# Patient Record
Sex: MALE | Race: WHITE | Employment: FULL TIME | URBAN - METROPOLITAN AREA
[De-identification: names, ages, dates, MRNs, and addresses within clinical notes are randomized per-mention and may not be internally consistent; named-entity substitution may affect disease eponyms.]

---

## 2019-09-04 ENCOUNTER — TELEPHONE (OUTPATIENT)
Dept: FAMILY MEDICINE | Facility: CLINIC | Age: 54
End: 2019-09-04

## 2019-09-04 NOTE — TELEPHONE ENCOUNTER
Patient's wife called requesting colonoscopy referral to [facility] in [place]. Please call back to let them know if they are able to get referral.    Liset Oneil-Patient Rep

## 2019-09-30 ENCOUNTER — HEALTH MAINTENANCE LETTER (OUTPATIENT)
Age: 54
End: 2019-09-30

## 2019-09-30 ENCOUNTER — OFFICE VISIT (OUTPATIENT)
Dept: FAMILY MEDICINE | Facility: CLINIC | Age: 54
End: 2019-09-30
Payer: COMMERCIAL

## 2019-09-30 VITALS
HEART RATE: 68 BPM | OXYGEN SATURATION: 97 % | RESPIRATION RATE: 17 BRPM | BODY MASS INDEX: 28.92 KG/M2 | WEIGHT: 202 LBS | DIASTOLIC BLOOD PRESSURE: 68 MMHG | SYSTOLIC BLOOD PRESSURE: 126 MMHG | HEIGHT: 70 IN | TEMPERATURE: 98.2 F

## 2019-09-30 DIAGNOSIS — Z00.00 ROUTINE GENERAL MEDICAL EXAMINATION AT A HEALTH CARE FACILITY: Primary | ICD-10-CM

## 2019-09-30 DIAGNOSIS — Z12.11 COLON CANCER SCREENING: ICD-10-CM

## 2019-09-30 DIAGNOSIS — Z11.4 ENCOUNTER FOR SCREENING FOR HIV: ICD-10-CM

## 2019-09-30 PROCEDURE — 90471 IMMUNIZATION ADMIN: CPT | Performed by: FAMILY MEDICINE

## 2019-09-30 PROCEDURE — 90715 TDAP VACCINE 7 YRS/> IM: CPT | Performed by: FAMILY MEDICINE

## 2019-09-30 PROCEDURE — 87389 HIV-1 AG W/HIV-1&-2 AB AG IA: CPT | Performed by: FAMILY MEDICINE

## 2019-09-30 PROCEDURE — 90472 IMMUNIZATION ADMIN EACH ADD: CPT | Performed by: FAMILY MEDICINE

## 2019-09-30 PROCEDURE — 80053 COMPREHEN METABOLIC PANEL: CPT | Performed by: FAMILY MEDICINE

## 2019-09-30 PROCEDURE — 99386 PREV VISIT NEW AGE 40-64: CPT | Mod: 25 | Performed by: FAMILY MEDICINE

## 2019-09-30 PROCEDURE — 36415 COLL VENOUS BLD VENIPUNCTURE: CPT | Performed by: FAMILY MEDICINE

## 2019-09-30 PROCEDURE — 90682 RIV4 VACC RECOMBINANT DNA IM: CPT | Performed by: FAMILY MEDICINE

## 2019-09-30 PROCEDURE — 80061 LIPID PANEL: CPT | Performed by: FAMILY MEDICINE

## 2019-09-30 ASSESSMENT — ENCOUNTER SYMPTOMS
CHILLS: 0
WEAKNESS: 0
PALPITATIONS: 0
DYSURIA: 0
JOINT SWELLING: 0
HEADACHES: 0
SHORTNESS OF BREATH: 0
PARESTHESIAS: 0
EYE PAIN: 0
ARTHRALGIAS: 0
FEVER: 0
SORE THROAT: 0
NAUSEA: 0
MYALGIAS: 0
NERVOUS/ANXIOUS: 0
COUGH: 0
HEARTBURN: 0
CONSTIPATION: 0
FREQUENCY: 0
HEMATOCHEZIA: 0
DIARRHEA: 0
HEMATURIA: 0
ABDOMINAL PAIN: 0
DIZZINESS: 0

## 2019-09-30 ASSESSMENT — MIFFLIN-ST. JEOR: SCORE: 1767.52

## 2019-09-30 NOTE — PROGRESS NOTES
"SUBJECTIVE:   CC: Edgar Celestin is an 54 year old male who presents for preventative health visit.     Healthy Habits:     Getting at least 3 servings of Calcium per day:  Yes    Bi-annual eye exam:  Yes    Dental care twice a year:  Yes    Sleep apnea or symptoms of sleep apnea:  None    Diet:  Regular (no restrictions)    Frequency of exercise:  2-3 days/week    Duration of exercise:  15-30 minutes    Taking medications regularly:  Yes    PHQ-2 Total Score: 0    Additional concerns today:  No    Patient reports an intermittent right-sided abdominal \"quiver.\" This started one year ago after lifting a heavy log. The symptoms occur while sitting in specific positions. Denies abdominal pain.     Today's PHQ-2 Score:   PHQ-2 ( 1999 [employer]) 9/30/2019   Q1: Little interest or pleasure in doing things 0   Q2: Feeling down, depressed or hopeless 0   PHQ-2 Score 0   Q1: Little interest or pleasure in doing things Not at all   Q2: Feeling down, depressed or hopeless Not at all   PHQ-2 Score 0     Abuse: Current or Past(Physical, Sexual or Emotional)- No  Do you feel safe in your environment? Yes    Social History     Tobacco Use     Smoking status: Never Smoker     Smokeless tobacco: Never Used   Substance Use Topics     Alcohol use: Yes     Comment: occasionally      If you drink alcohol do you typically have >3 drinks per day or >7 drinks per week? No    Alcohol Use 9/30/2019   Prescreen: >3 drinks/day or >7 drinks/week? No   Prescreen: >3 drinks/day or >7 drinks/week? -   No flowsheet data found.    Last PSA: No results found for: PSA    Reviewed orders with patient. Reviewed health maintenance and updated orders accordingly - Yes  Patient Active Problem List   Diagnosis     Allergic rhinitis     Acute low back pain     Nonallopathic lesion of lumbar region     Blood in stool     Impaired fasting glucose     Family history of coronary artery disease     Past Surgical History:   Procedure Laterality Date     C " NONSPECIFIC PROCEDURE  1/01    Sinus surgery -- Abstracted 05/28/02     C NONSPECIFIC PROCEDURE      T&A -- Abstracted 05/28/02       Social History     Tobacco Use     Smoking status: Never Smoker     Smokeless tobacco: Never Used   Substance Use Topics     Alcohol use: Yes     Comment: occasionally      Family History   Problem Relation Age of Onset     Hypertension Mother      Gastrointestinal Disease Father         colonic polyps     Heart Disease Father 64        cad     Cancer Father         lung cancer     Cervical Cancer Sister      C.A.D. No family hx of      Cancer - colorectal No family hx of      Prostate Cancer No family hx of          Reviewed and updated as needed this visit by clinical staff  Tobacco  Allergies  Meds       Reviewed and updated as needed this visit by Provider        Past Medical History:   Diagnosis Date     Allergic rhinitis, cause unspecified       Past Surgical History:   Procedure Laterality Date     C NONSPECIFIC PROCEDURE  1/01    Sinus surgery -- Abstracted 05/28/02     C NONSPECIFIC PROCEDURE      T&A -- Abstracted 05/28/02       Review of Systems   Constitutional: Negative for chills and fever.   HENT: Negative for congestion, ear pain, hearing loss and sore throat.    Eyes: Negative for pain and visual disturbance.   Respiratory: Negative for cough and shortness of breath.    Cardiovascular: Negative for chest pain, palpitations and peripheral edema.   Gastrointestinal: Negative for abdominal pain, constipation, diarrhea, heartburn, hematochezia and nausea.   Genitourinary: Negative for discharge, dysuria, frequency, genital sores, hematuria, impotence and urgency.   Musculoskeletal: Negative for arthralgias, joint swelling and myalgias.   Skin: Negative for rash.   Neurological: Negative for dizziness, weakness, headaches and paresthesias.   Psychiatric/Behavioral: Negative for mood changes. The patient is not nervous/anxious.      This document serves as a record of  "the services and decisions personally performed and made by Griffin Hewitt MD. It was created on his behalf by Angélica Jerez, a trained medical scribe. The creation of this document is based on the provider's statements to the medical scribe.  Angélica Jerez 9:37 AM September 30, 2019    OBJECTIVE:   /68 (BP Location: Right arm, Patient Position: Chair, Cuff Size: Adult Regular)   Pulse 68   Temp 98.2  F (36.8  C) (Oral)   Resp 17   Ht 1.778 m (5' 10\")   Wt 91.6 kg (202 lb)   SpO2 97%   BMI 28.98 kg/m      Physical Exam  GENERAL: healthy, alert and no distress  EYES: Eyes grossly normal to inspection, PERRL and conjunctivae and sclerae normal  HENT: ear canals and TM's normal, nose and mouth without ulcers or lesions  NECK: no adenopathy, no asymmetry, masses, or scars and thyroid normal to palpation  RESP: lungs clear to auscultation - no rales, rhonchi or wheezes  CV: regular rate and rhythm, normal S1 S2, no S3 or S4, no murmur, click or rub, no peripheral edema and peripheral pulses strong  ABDOMEN: soft, nontender, no hepatosplenomegaly, no masses and bowel sounds normal   (male): normal male genitalia without lesions or urethral discharge, no hernia  MS: no gross musculoskeletal defects noted, no edema  SKIN: no suspicious lesions or rashes  NEURO: Normal strength and tone, mentation intact and speech normal  PSYCH: mentation appears normal, affect normal/bright    Diagnostic Test Results:  Labs reviewed in Epic  No results found for this or any previous visit (from the past 24 hour(s)).    ASSESSMENT/PLAN:   1. Routine general medical examination at a health care facility  - Lipid panel reflex to direct LDL Fasting  - Comprehensive metabolic panel    2. Colon cancer screening  - GASTROENTEROLOGY ADULT REF PROCEDURE ONLY Carmine Bose [phone]; [facility]    3. Encounter for screening for HIV  - HIV Antigen Antibody Combo    COUNSELING:   Reviewed preventive health counseling, as " "reflected in patient instructions  Special attention given to:        Regular exercise       Healthy diet/nutrition       Immunizations    Vaccinated for: Influenza and TDAP       HIV screeninx in teen years, 1x in adult years, and at intervals if high risk       Colon cancer screening       Prostate cancer screening     Estimated body mass index is 28.98 kg/m  as calculated from the following:    Height as of this encounter: 1.778 m (5' 10\").    Weight as of this encounter: 91.6 kg (202 lb).     Weight management plan: Discussed healthy diet and exercise guidelines     reports that he has never smoked. He has never used smokeless tobacco.      Counseling Resources:  ATP IV Guidelines  Pooled Cohorts Equation Calculator  FRAX Risk Assessment  ICSI Preventive Guidelines  Dietary Guidelines for Americans,   USDA's MyPlate  ASA Prophylaxis  Lung CA Screening    The information in this document, created by the medical scribe for me, accurately reflects the services I personally performed and the decisions made by me. I have reviewed and approved this document for accuracy prior to leaving the patient care area.  2019 10:00 AM    Griffin Hewitt MD  [facility]  "

## 2019-10-01 LAB
ALBUMIN SERPL-MCNC: 4.4 G/DL (ref 3.4–5)
ALP SERPL-CCNC: 77 U/L (ref 40–150)
ALT SERPL W P-5'-P-CCNC: 57 U/L (ref 0–70)
ANION GAP SERPL CALCULATED.3IONS-SCNC: 6 MMOL/L (ref 3–14)
AST SERPL W P-5'-P-CCNC: 24 U/L (ref 0–45)
BILIRUB SERPL-MCNC: 0.8 MG/DL (ref 0.2–1.3)
BUN SERPL-MCNC: 12 MG/DL (ref 7–30)
CALCIUM SERPL-MCNC: 8.7 MG/DL (ref 8.5–10.1)
CHLORIDE SERPL-SCNC: 109 MMOL/L (ref 94–109)
CHOLEST SERPL-MCNC: 196 MG/DL
CO2 SERPL-SCNC: 24 MMOL/L (ref 20–32)
CREAT SERPL-MCNC: 0.94 MG/DL (ref 0.66–1.25)
GFR SERPL CREATININE-BSD FRML MDRD: >90 ML/MIN/{1.73_M2}
GLUCOSE SERPL-MCNC: 90 MG/DL (ref 70–99)
HDLC SERPL-MCNC: 40 MG/DL
HIV 1+2 AB+HIV1 P24 AG SERPL QL IA: NONREACTIVE
LDLC SERPL CALC-MCNC: 132 MG/DL
NONHDLC SERPL-MCNC: 156 MG/DL
POTASSIUM SERPL-SCNC: 4.1 MMOL/L (ref 3.4–5.3)
PROT SERPL-MCNC: 7 G/DL (ref 6.8–8.8)
SODIUM SERPL-SCNC: 139 MMOL/L (ref 133–144)
TRIGL SERPL-MCNC: 122 MG/DL

## 2019-10-10 ENCOUNTER — MYC MEDICAL ADVICE (OUTPATIENT)
Dept: FAMILY MEDICINE | Facility: CLINIC | Age: 54
End: 2019-10-10

## 2019-10-10 DIAGNOSIS — Z82.49 FAMILY HISTORY OF CORONARY ARTERY DISEASE: Primary | ICD-10-CM

## 2019-10-10 RX ORDER — ATORVASTATIN CALCIUM 20 MG/1
20 TABLET, FILM COATED ORAL DAILY
Qty: 90 TABLET | Refills: 3 | Status: SHIPPED | OUTPATIENT
Start: 2019-10-10 | End: 2020-03-02

## 2019-10-10 NOTE — TELEPHONE ENCOUNTER
National guidelines very somewhat but American Heart Association/ACC recommendations note consideration of statin could be started at pooled cohort greater than 5% based on patient circumstances.  He is at 5.4% on pooled cohort equation with these numbers so this could be reasonable.    Alternatively we could set up for a CT coronary calcium scan.  If this is a very low risk or 0 number it may be reasonable for him to not go on medication.  A higher number we would recommend statin.  If you would like to do this test we can set it up otherwise we would recommend Lipitor 20 mg daily.    The 10-year ASCVD risk score (Tracy SHELTON Jr., et al., 2013) is: 5.4%    Values used to calculate the score:      Age: 54 years      Sex: Male      Is Non- : No      Diabetic: No      Tobacco smoker: No      Systolic Blood Pressure: 126 mmHg      Is BP treated: No      HDL Cholesterol: 40 mg/dL      Total Cholesterol: 196 mg/dL

## 2020-01-13 ENCOUNTER — HOSPITAL ENCOUNTER (OUTPATIENT)
Facility: CLINIC | Age: 55
Discharge: HOME | End: 2020-01-13
Attending: INTERNAL MEDICINE | Admitting: INTERNAL MEDICINE
Payer: COMMERCIAL

## 2020-01-13 VITALS
BODY MASS INDEX: 29.35 KG/M2 | HEART RATE: 58 BPM | SYSTOLIC BLOOD PRESSURE: 111 MMHG | DIASTOLIC BLOOD PRESSURE: 79 MMHG | OXYGEN SATURATION: 96 % | WEIGHT: 205 LBS | RESPIRATION RATE: 12 BRPM | HEIGHT: 70 IN

## 2020-01-13 LAB — COLONOSCOPY: NORMAL

## 2020-01-13 PROCEDURE — 88305 TISSUE EXAM BY PATHOLOGIST: CPT | Performed by: INTERNAL MEDICINE

## 2020-01-13 PROCEDURE — G0500 MOD SEDAT ENDO SERVICE >5YRS: HCPCS | Performed by: INTERNAL MEDICINE

## 2020-01-13 PROCEDURE — 45385 COLONOSCOPY W/LESION REMOVAL: CPT | Performed by: INTERNAL MEDICINE

## 2020-01-13 PROCEDURE — 25000128 H RX IP 250 OP 636: Performed by: INTERNAL MEDICINE

## 2020-01-13 PROCEDURE — 88305 TISSUE EXAM BY PATHOLOGIST: CPT | Mod: 26 | Performed by: INTERNAL MEDICINE

## 2020-01-13 RX ORDER — ONDANSETRON 2 MG/ML
4 INJECTION INTRAMUSCULAR; INTRAVENOUS EVERY 6 HOURS PRN
Status: DISCONTINUED | OUTPATIENT
Start: 2020-01-13 | End: 2020-01-13 | Stop reason: HOSPADM

## 2020-01-13 RX ORDER — LIDOCAINE 40 MG/G
CREAM TOPICAL
Status: DISCONTINUED | OUTPATIENT
Start: 2020-01-13 | End: 2020-01-13 | Stop reason: HOSPADM

## 2020-01-13 RX ORDER — NALOXONE HYDROCHLORIDE 0.4 MG/ML
.1-.4 INJECTION, SOLUTION INTRAMUSCULAR; INTRAVENOUS; SUBCUTANEOUS
Status: DISCONTINUED | OUTPATIENT
Start: 2020-01-13 | End: 2020-01-13 | Stop reason: HOSPADM

## 2020-01-13 RX ORDER — FLUMAZENIL 0.1 MG/ML
0.2 INJECTION, SOLUTION INTRAVENOUS
Status: DISCONTINUED | OUTPATIENT
Start: 2020-01-13 | End: 2020-01-13 | Stop reason: HOSPADM

## 2020-01-13 RX ORDER — FENTANYL CITRATE 50 UG/ML
INJECTION, SOLUTION INTRAMUSCULAR; INTRAVENOUS PRN
Status: DISCONTINUED | OUTPATIENT
Start: 2020-01-13 | End: 2020-01-13 | Stop reason: HOSPADM

## 2020-01-13 RX ORDER — ONDANSETRON 2 MG/ML
4 INJECTION INTRAMUSCULAR; INTRAVENOUS
Status: DISCONTINUED | OUTPATIENT
Start: 2020-01-13 | End: 2020-01-13 | Stop reason: HOSPADM

## 2020-01-13 RX ORDER — ONDANSETRON 4 MG/1
4 TABLET, ORALLY DISINTEGRATING ORAL EVERY 6 HOURS PRN
Status: DISCONTINUED | OUTPATIENT
Start: 2020-01-13 | End: 2020-01-13 | Stop reason: HOSPADM

## 2020-01-13 ASSESSMENT — MIFFLIN-ST. JEOR: SCORE: 1781.12

## 2020-01-13 NOTE — PROCEDURES
PRE-PROCEDURE H&P    CHIEF COMPLAINT / REASON FOR PROCEDURE:  screening    PERTINENT HISTORY :    Past Medical History:   Diagnosis Date     Allergic rhinitis, cause unspecified       Past Surgical History:   Procedure Laterality Date     C NONSPECIFIC PROCEDURE  1/01    Sinus surgery -- Abstracted 05/28/02     C NONSPECIFIC PROCEDURE      T&A -- Abstracted 05/28/02         Bleeding tendencies:  No    Relevant Family History:  NONE     Relevant Social History:  NONE      A relevant review of systems was performed and was negative      ALLERGIES/SENSITIVITIES:   Allergies   Allergen Reactions     Amoxicillin      Cats      Dogs      Dust Mite Extract      Mold      No Known Allergies        CURRENT MEDICATIONS:   No current outpatient medications on file.        PRE-SEDATION ASSESSMENT:    Lung Exam:  normal  Heart Exam:  normal  Airway Exam: normal  Previous reaction to anesthesia/sedation:   No  Sedation plan based on assessment: Moderate (conscious) sedation  ASA Classification:  2 - Mild systemic disease        IMPRESSION:  screening    PLAN:  colonoscopy    Yancy Bean MD  [facility]  Office: [phone]

## 2020-01-13 NOTE — LETTER
November 8, 2019      Edgar Celestin  [address], [place] [address]       Thank you for choosing [facility]. You are scheduled for the following service(s).   Please be aware that coverage of these services is subject to the terms and limitations of your health insurance plan.  Call member services at your health plan with any benefit or coverage questions.    Date:  Monday November 25, 2019             Procedure:  COLONOSCOPY  Doctor:        Dr Wilson   Arrival Time:  1000  *Check in at Emergency/Endoscopy desk*  Procedure Time:  1030      Location:   [facility]        Endoscopy Department, First Floor (Enter through ER Doors) *        [address] [place], [place] [id number] [phone] or [phone] ([facility]) to reschedule      MIRALAX -GATORADE  PREP  Colonoscopy is the most accurate test to detect colon polyps and colon cancer; and the only test where polyps can be removed. During this procedure, a doctor examines the lining of your large intestine and rectum through a flexible tube.   Transportation  You must arrange for a ride for the day of your procedure with a responsible adult. A taxi , Uber, etc, is not an option unless you are accompanied by a responsible adult. If you fail to arrange transportation with a responsible adult, your procedure will be cancelled and rescheduled.    Purchase the  following supplies at your local pharmacy:  - 2 (two) bisacodyl tablets: each tablet contains 5 mg.  (Dulcolax  laxative NOT Dulcolax  stool softener)   - 1 (one) 8.3 oz bottle of Polyethylene Glycol (PEG) 3350 Powder   (MiraLAX , Smooth LAX , ClearLAX  or equivalent)  - 64 oz Gatorade    Regular Gatorade, Gatorade G2 , Powerade , Powerade Zero  or Pedialyte  is acceptable. Red colored flavors are not allowed; all other colors (yellow, green, orange, purple and blue) are okay. It is also okay to buy two 2.12 oz packets of powdered Gatorade  that can be mixed with water to a total volume of 64 oz of liquid.  - 1 (one) 10 oz bottle of Magnesium Citrate (Red colored flavors are not allowed)  It is also okay for you to use a 0.5 oz package of powdered magnesium citrate (17 g) mixed with 10 oz of water.      PREPARATION FOR COLONOSCOPY    7 days before:    Discontinue fiber supplements and medications containing iron. This includes Metamucil  and Fibercon ; and multivitamins with iron.    3 days before:    Begin a low-fiber diet. A low-fiber diet helps making the cleanout more effective.     Examples of a low-fiber diet include (but are not limited to): white bread, white rice, pasta, crackers, fish, chicken, eggs, ground beef, creamy peanut butter, cooked/steamed/boiled vegetables, canned fruit, bananas, melons, milk, plain yogurt cheese, salad dressing and other condiments.     The following are not allowed on a low-fiber diet: seeds, nuts, popcorn, bran, whole wheat, corn, quinoa, raw fruits and vegetables, berries and dried fruit, beans and lentils.    For additional details on low-fiber diet, please refer to the table on the last page.    2 days before:    Continue the low-fiber diet.     Drink at least 8 glasses of water throughout the day.     Stop eating solid foods at 11:45 pm.    1 day before:    In the morning: begin a clear liquid diet (liquids you can see through).     Examples of a clear liquid diet include: water, clear broth or bouillon, Gatorade, Pedialyte or Powerade, carbonated and non-carbonated soft drinks (Sprite , 7-Up , ginger ale), strained fruit juices without pulp (apple, white grape, white cranberry), Jell-O  and popsicles.     The following are not allowed on a clear liquid diet: red liquids, alcoholic beverages, dairy products (milk, creamer, and yogurt), protein shakes, creamy broths, juice with pulp and chewing tobacco.    At noon: take 2 (two) bisacodyl tablets     At 4 (and no later than 6pm): start drinking the  Miralax-Gatorade preparation (8.3 oz of Miralax mixed with 64 oz of Gatorade in a large pitcher). Drink 1(one) 8 oz glass every 15 minutes thereafter, until the mixture is gone.    COLON CLEANSING TIPS: drink adequate amounts of fluids before and after your colon cleansing to prevent dehydration. Stay near a toilet because you will have diarrhea. Even if you are sitting on the toilet, continue to drink the cleansing solution every 15 minutes. If you feel nauseous or vomit, rinse your mouth with water, take a 15 to 30-minute-break and then continue drinking the solution. You will be uncomfortable until the stool has flushed from your colon (in about 2 to 4 hours). You may feel chilled.    Day of your procedure  You may take all of your morning medications including blood pressure medications, blood thinners (if you have not been instructed to stop these by our office), methadone, anti-seizure medications with sips of water 3 hours prior to your procedure or earlier. Do not take insulin or vitamins prior to your procedure. Continue the clear liquid diet.       4 hours prior: drink 10 oz of magnesium citrate. It may be easier to drink it with a straw.    STOP consuming all liquids after that.     Do not take anything by mouth during this time.     Allow extra time to travel to your procedure as you may need to stop and use a restroom along the way.    You are ready for the procedure, if you followed all instructions and your stool is no longer formed, but clear or yellow liquid. If you are unsure whether your colon is clean, please call our office at [phone] before you leave for your appointment.    Bring the following to your procedure:  - Insurance Card/Photo ID.   - List of current medications including over-the-counter medications and supplements.   - Your rescue inhaler if you currently use one to control asthma.      Canceling or rescheduling your appointment:   If you must cancel or reschedule your  appointment, please call [phone] as soon as possible.      COLONOSCOPY PRE-PROCEDURE CHECKLIST    If you have diabetes, ask your regular doctor for diet and medication restrictions.  If you take an anticoagulant or anti-platelet medication (such as Coumadin , Lovenox , Pradaxa , Xarelto , Eliquis , etc.), please call your primary doctor for advice on holding this medication.  If you take aspirin you may continue to do so.  If you are or may be pregnant, please discuss the risks and benefits of this procedure with your doctor.        What happens during a colonoscopy?    Plan to spend up to two hours, starting at registration time, at the endoscopy center the day of your procedure. The colonoscopy takes an average of 15 to 30 minutes. Recovery time is about 30 minutes.      Before the exam:    You will change into a gown.    Your medical history and medication list will be reviewed with you, unless that has been done over the phone prior to the procedure.     A nurse will insert an intravenous (IV) line into your hand or arm.    The doctor will meet with you and will give you a consent form to sign.  During the exam:     Medicine will be given through the IV line to help you relax.     Your heart rate and oxygen levels will be monitored. If your blood pressure is low, you may be given fluids through the IV line.     The doctor will insert a flexible hollow tube, called a colonoscope, into your rectum. The scope will be advanced slowly through the large intestine (colon).    You may have a feeling of fullness or pressure.     If an abnormal tissue or a polyp is found, the doctor may remove it through the endoscope for closer examination, or biopsy. Tissue removal is painless    After the exam:           Any tissue samples removed during the exam will be sent to a lab for evaluation. It may take 5-7 working days for you to be notified of the results.     A nurse will provide you with complete discharge  instructions before you leave the endoscopy center. Be sure to ask the nurse for specific instructions if you take blood thinners such as Aspirin, Coumadin or Plavix.     The doctor will prepare a full report for you and for the physician who referred you for the procedure.     Your doctor will talk with you about the initial results of your exam.      Medication given during the exam will prohibit you from driving for the rest of the day.     Following the exam, you may resume your normal diet. Your first meal should be light, no greasy foods. Avoid alcohol until the next day.     You may resume your regular activities the day after the procedure.         LOW-FIBER DIET    Foods RECOMMENDED Foods to AVOID   Breads, Cereal, Rice and Pasta:   White bread, rolls, biscuits, croissant and gilmer toast.   Waffles, Kazakh toast and pancakes.   White rice, noodles, pasta, macaroni and peeled cooked potatoes.   Plain crackers and saltines.   Cooked cereals: farina, cream of rice.   Cold cereals: Puffed Rice , Rice Krispies , Corn Flakes  and Special K    Breads, Cereal, Rice and Pasta:   Breads or rolls with nuts, seeds or fruit.   Whole wheat, pumpernickel, rye breads and cornbread.   Potatoes with skin, brown or wild rice, and kasha (buckwheat).     Vegetables:   Tender cooked and canned vegetables without seeds: carrots, asparagus tips, green or wax beans, pumpkin, spinach, lima beans. Vegetables:   Raw or steamed vegetables.   Vegetables with seeds.   Sauerkraut.   Winter squash, peas, broccoli, Brussel sprouts, cabbage, onions, cauliflower, baked beans, peas and corn.   Fruits:   Strained fruit juice.   Canned fruit, except pineapple.   Ripe bananas and melon. Fruits:   Prunes and prune juice.   Raw fruits.   Dried fruits: figs, dates and raisins.   Milk/Dairy:   Milk: plain or flavored.   Yogurt, custard and ice cream.   Cheese and cottage cheese Milk/Dairy:     Meat and other proteins:   ground, well-cooked tender  beef, lamb, ham, veal, pork, fish, poultry and organ meats.   Eggs.   Peanut butter without nuts. Meat and other proteins:   Tough, fibrous meats with gristle.   Dry beans, peas and lentils.   Peanut butter with nuts.   Tofu.   Fats, Snack, Sweets, Condiments and Beverages:   Margarine, butter, oils, mayonnaise, sour cream and salad dressing, plain gravy.   Sugar, hard candy, clear jelly, honey and syrup.   Spices, cooked herbs, bouillon, broth and soups made with allowed vegetable, ketchup and mustard.   Coffee, tea and carbonated drinks.   Plain cakes, cookies and pretzels.   Gelatin, plain puddings, custard, ice cream, sherbet and popsicles. Fats, Snack, Sweets, Condiments and Beverages:   Nuts, seeds and coconut.   Jam, marmalade and preserves.   Pickles, olives, relish and horseradish.   All desserts containing nuts, seeds, dried fruit and coconut; or made from whole grains or bran.   Candy made with nuts or seeds.   Popcorn.                     DIRECTIONS TO THE ENDOSCOPY DEPARTMENT     From the north ([facility])  Take [address] South, exit on [address] [address]. Get into the left hand luisa, turn left (east), go one-half mile to [address] and turn left. Go north to the first stoplight, take a right on [address] and follow it to the Emergency entrance.    From the south ([facility])  Take [address]N to the [address]E split and exit on [address] [address]. On [address] [address], turn left (west) to [address]. Turn right (north) on [address]. Go north to the first stoplight, take a right on [address] and follow it to the Emergency entrance.    From the east via 35E ([facility])  Take [address] south to [address] [address] exit. Turn right on [address] [address]. Go west to [address]. Turn right (north) on [address]. Go to the first stoplight, take a right and follow on [address] to the Emergency entrance.    From the east via [address] ([facility])  [address]  to [address]. Turn left (south) on [address] to [address]. Turn left (east) on [address] and follow it to the Emergency entrance.    From the west via [address] ([place], [place])  Take [address] east to [address]. Turn right (south) on [address] to [address]. Turn left (east) on [address] and follow it to the Emergency entrance.

## 2020-01-14 LAB — COPATH REPORT: NORMAL

## 2020-03-02 ENCOUNTER — OFFICE VISIT (OUTPATIENT)
Dept: FAMILY MEDICINE | Facility: CLINIC | Age: 55
End: 2020-03-02
Payer: COMMERCIAL

## 2020-03-02 VITALS
DIASTOLIC BLOOD PRESSURE: 80 MMHG | RESPIRATION RATE: 16 BRPM | HEART RATE: 67 BPM | TEMPERATURE: 97.8 F | SYSTOLIC BLOOD PRESSURE: 120 MMHG | OXYGEN SATURATION: 96 % | HEIGHT: 70 IN | WEIGHT: 216.3 LBS | BODY MASS INDEX: 30.97 KG/M2

## 2020-03-02 DIAGNOSIS — G89.29 CHRONIC ABDOMINAL PAIN: ICD-10-CM

## 2020-03-02 DIAGNOSIS — Z82.49 FAMILY HISTORY OF CORONARY ARTERY DISEASE: ICD-10-CM

## 2020-03-02 DIAGNOSIS — R10.9 CHRONIC ABDOMINAL PAIN: ICD-10-CM

## 2020-03-02 DIAGNOSIS — R10.13 EPIGASTRIC PAIN: Primary | ICD-10-CM

## 2020-03-02 PROCEDURE — 99214 OFFICE O/P EST MOD 30 MIN: CPT | Performed by: FAMILY MEDICINE

## 2020-03-02 RX ORDER — ATORVASTATIN CALCIUM 20 MG/1
20 TABLET, FILM COATED ORAL DAILY
Qty: 90 TABLET | Refills: 3 | Status: SHIPPED | OUTPATIENT
Start: 2020-03-02

## 2020-03-02 ASSESSMENT — MIFFLIN-ST. JEOR: SCORE: 1832.38

## 2020-03-02 NOTE — PROGRESS NOTES
"Subjective     Edgar Celestin is a 54 year old male who presents to clinic today for the following health issues:    HPI   Pt is here for a pain in right upper quad pain that has been there for a yr and its a dull pain.     Patient here with concerns of over 1 year of abdominal pain in the right upper quadrant epigastric area.  This came on initially he thinks when he was doing some lifting moving a tree stump but has not improved.  He feels a \"quivering\" sensation in that area that will come and go.  He has some dull aching pain that is there consistently.  This does not improve or worsen with meals.  Has some improvement in the quivering symptoms when he gets up and walks around.  He will have it come on more when he is sitting.  Denies back pain.  No radiculopathy or numbness.  Denies nausea or vomiting.  No hematochezia or melena.  No diarrhea.  Has had previous epigastric issues several years ago for which he had work-up.    Patient Active Problem List   Diagnosis     Allergic rhinitis     Family history of coronary artery disease     Past Surgical History:   Procedure Laterality Date     C NONSPECIFIC PROCEDURE  1/01    Sinus surgery -- Abstracted 05/28/02     C NONSPECIFIC PROCEDURE      T&A -- Abstracted 05/28/02       Social History     Tobacco Use     Smoking status: Never Smoker     Smokeless tobacco: Never Used   Substance Use Topics     Alcohol use: Yes     Comment: 1-2 per week     Family History   Problem Relation Age of Onset     Hypertension Mother      Gastrointestinal Disease Father         colonic polyps     Heart Disease Father 64        cad     Cancer Father         lung cancer     Cervical Cancer Sister      C.A.D. No family hx of      Cancer - colorectal No family hx of      Prostate Cancer No family hx of      Colon Cancer No family hx of          Reviewed and updated as needed this visit by Provider  Tobacco  Allergies  Meds  Problems  Med Hx  Surg Hx  Fam Hx         Review of " "Systems   ROS COMP: CONSTITUTIONAL: NEGATIVE for fever, chills, change in weight  GI: as above      Objective    /80 (BP Location: Right arm, Patient Position: Chair, Cuff Size: Adult Large)   Pulse 67   Temp 97.8  F (36.6  C) (Oral)   Resp 16   Ht 1.778 m (5' 10\")   Wt 98.1 kg (216 lb 4.8 oz)   SpO2 96%   BMI 31.04 kg/m    Body mass index is 31.04 kg/m .  Physical Exam   GENERAL: healthy, alert and no distress  ABDOMEN: soft, nontender, no hepatosplenomegaly, no masses and bowel sounds normal        Assessment & Plan     1. Epigastric pain  Unclear etiology.  May be muscular versus dyspepsia.  Had some difficulty passing solid foods for a few weeks so this is improved.  Trial of medication below.  - omeprazole (PRILOSEC) 20 MG DR capsule; Take 1 capsule (20 mg) by mouth daily  Dispense: 30 capsule; Refill: 1  - CT Abdomen Pelvis w/o Contrast; Future    2. Chronic abdominal pain  With duration of time of abdominal pain going on over 1 year recommend imaging as below.  - CT Abdomen Pelvis w/o Contrast; Future    3. Family history of coronary artery disease  - atorvastatin (LIPITOR) 20 MG tablet; Take 1 tablet (20 mg) by mouth daily  Dispense: 90 tablet; Refill: 3    Return in about 1 year (around 3/2/2021) for routine physical.    Griffin Hewitt MD  [facility]        "

## 2020-03-03 ENCOUNTER — ANCILLARY PROCEDURE (OUTPATIENT)
Dept: CT IMAGING | Facility: CLINIC | Age: 55
End: 2020-03-03
Attending: FAMILY MEDICINE
Payer: COMMERCIAL

## 2020-03-03 DIAGNOSIS — R10.13 EPIGASTRIC PAIN: ICD-10-CM

## 2020-03-03 DIAGNOSIS — R10.9 CHRONIC ABDOMINAL PAIN: ICD-10-CM

## 2020-03-03 DIAGNOSIS — G89.29 CHRONIC ABDOMINAL PAIN: ICD-10-CM

## 2020-05-27 ENCOUNTER — MYC MEDICAL ADVICE (OUTPATIENT)
Dept: FAMILY MEDICINE | Facility: CLINIC | Age: 55
End: 2020-05-27

## 2020-05-27 NOTE — TELEPHONE ENCOUNTER
1. Doesn't matter the degree, would presume NAFLD. Should generally take good care of himself, eat whole foods, diet low in saturated fat, get regular aerobic exercise.     2. No concerns about mild plaque build up. Should follow lifestyle mods as above. Already on a statin. Besides FH, has minimal risk factors.     3. Nothing about his message puts him at increased risk for COVID19.    JH

## 2020-07-15 ENCOUNTER — VIRTUAL VISIT (OUTPATIENT)
Dept: FAMILY MEDICINE | Facility: CLINIC | Age: 55
End: 2020-07-15
Payer: COMMERCIAL

## 2020-07-15 DIAGNOSIS — Z20.822 SUSPECTED COVID-19 VIRUS INFECTION: ICD-10-CM

## 2020-07-15 DIAGNOSIS — Z20.822 SUSPECTED COVID-19 VIRUS INFECTION: Primary | ICD-10-CM

## 2020-07-15 PROCEDURE — 99213 OFFICE O/P EST LOW 20 MIN: CPT | Mod: TEL | Performed by: PHYSICIAN ASSISTANT

## 2020-07-15 PROCEDURE — 36415 COLL VENOUS BLD VENIPUNCTURE: CPT | Performed by: PHYSICIAN ASSISTANT

## 2020-07-15 PROCEDURE — 99000 SPECIMEN HANDLING OFFICE-LAB: CPT | Performed by: PHYSICIAN ASSISTANT

## 2020-07-15 PROCEDURE — 86769 SARS-COV-2 COVID-19 ANTIBODY: CPT | Mod: 90 | Performed by: PHYSICIAN ASSISTANT

## 2020-07-15 NOTE — PROGRESS NOTES
"Edgar Celestin is a 55 year old male who is being evaluated via a billable telephone visit.      The patient has been notified of following:     \"This telephone visit will be conducted via a call between you and your physician/provider. We have found that certain health care needs can be provided without the need for a physical exam.  This service lets us provide the care you need with a short phone conversation.  If a prescription is necessary we can send it directly to your pharmacy.  If lab work is needed we can place an order for that and you can then stop by our lab to have the test done at a later time.    Telephone visits are billed at different rates depending on your insurance coverage. During this emergency period, for some insurers they may be billed the same as an in-person visit.  Please reach out to your insurance provider with any questions.    If during the course of the call the physician/provider feels a telephone visit is not appropriate, you will not be charged for this service.\"    Patient has given verbal consent for Telephone visit?  Yes    What phone number would you like to be contacted at? [phone]    How would you like to obtain your AVS? MyChart    Subjective     Edgar Celestin is a 55 year old male who presents via phone visit today for the following health issues:    HPI    serolgoy testing, may need covid test too, is a  with [employer], flew much in [place] late 2019 and earily 2020, had issues in [place] when he was flying and had mild flu like symptoms x 3-4 days back in [place], not sick now, feels fine        BP Readings from Last 3 Encounters:   03/02/20 120/80   01/13/20 111/79   09/30/19 126/68    Wt Readings from Last 3 Encounters:   03/02/20 98.1 kg (216 lb 4.8 oz)   01/13/20 93 kg (205 lb)   09/30/19 91.6 kg (202 lb)                    Reviewed and updated as needed this visit by Provider         Review of Systems   Constitutional, HEENT, cardiovascular, pulmonary, gi and gu " systems are negative, except as otherwise noted.       Objective   Reported vitals:  There were no vitals taken for this visit.   healthy, alert and no distress  PSYCH: Alert and oriented times 3; coherent speech, normal   rate and volume, able to articulate logical thoughts, able   to abstract reason, no tangential thoughts, no hallucinations   or delusions  His affect is normal  RESP: No cough, no audible wheezing, able to talk in full sentences  Remainder of exam unable to be completed due to telephone visits    Diagnostic Test Results:  Labs reviewed in Epic        Assessment/Plan:    1. Suspected COVID-19 virus infection    - COVID-19 Virus (Coronavirus) Antibody & Titer Reflex; Future    No follow-ups on file.      Phone call duration:  10 minutes    Ramona Ann Aaseby-Aguilera, PA-C

## 2020-07-15 NOTE — LETTER
July 17, 2020        Edgra Celestin  [address] [id number]      COVID-19 Antibody Screen   Date Value Ref Range Status   07/15/2020 Negative  Final     Comment:     No COVID-19 antibodies detected.  Patients within 10 days of symptom onset for   COVID-19 may not produce sufficient levels of detectable antibodies.    Immunocompromised COVID-19 patients may take longer to develop antibodies.       COVID-19 Antibody, IgG Titer   Date Value Ref Range Status   07/15/2020 Not Applicable  Final     Comment:     Qualitative screen for total antibodies to COVID-19 (SARS-CoV-2) with   semi-quantitative measurement of IgG COVID-19 antibodies by endpoint titer.    COVID-19 antibodies may be elevated due to a past or current infection.  Negative results do not rule out COVID-19 infection.  Results from antibody   testing should not be used as the sole basis to diagnose or exclude SARS-CoV-2   infection or to inform infection status.  COVID-19 PCR test should be ordered   if current infection is suspected.  False positive results may occur in rare   cases due to cross-reacting antibodies.  This test was developed and its performance characteristics determined by the   [facility] ([facility]),   which is regulated under CLIA as qualified to perform high-complexity testing.    This test has not been reviewed by the FDA.  Testing performed by [facility], [facility], [address], [address], [place] [address]         No results found for: COVAB      You have tested NEGATIVE for COVID-19 antibodies. This suggests you have not had or been exposed to COVID-19. But it does not mean that for sure.     The test finds antibodies in most people 10 days after they get sick. For some people, it takes longer than 10 days for antibodies to show up. Others may never show antibodies against COVID-19, especially if  they have weak immune systems.    If you have COVID-19 symptoms now, please stay home and away from others.     What is antibody testing?    This is a kind of blood test. We take a small sample of your blood, and then test it for something called  antibodies.      Your body makes antibodies to fight infection. If your blood has antibodies for a certain germ, it means you ve been infected with that germ in the past.     Sometimes, antibodies stay in your body for years after you ve had the infection. They can be there even if the germ didn t make you sick. They are a sign that your body fought off the infection.    Will this test find antibodies in everyone who s had COVID-19?    No. The test finds antibodies in most people 10 days after they get sick. For some people, it takes longer than 10 days for antibodies to show up. Others may never show antibodies against COVID-19, especially if they have weak immune systems.    What does it mean if the test finds COVID-19 antibodies?    If we find these antibodies, it suggests:     This person has had the virus.     Their body s immune system fought the virus.     We don t know if this will help protect someone from getting COVID-19 again. Scientists are still learning about this.    What are the signs of COVID-19?    Signs of COVID-19 can appear from 2 to 14 days (up to 2 weeks) after you re infected. Some people have no symptoms or only mild symptoms. Others get very sick. The most common symptoms are:          Cough    Shortness of breath or trouble breathing  Or at least 2 of these symptoms:    Fever    Chills    Repeated shaking with chills    Muscle pain    Headache    Sore throat    Losing your sense of taste or smell    You may have other symptoms. Please contact your doctor or clinic for any symptoms that worry you.    Where can I get more information?     To learn the [place] s guidelines for staying home, please visit the [place] Department of Health website  at https://www.health.state.[place]/diseases/coronavirus/basics.html    To learn more about COVID-19 and how to care for yourself at home, please visit the CDC website at https://www.cdc.gov/coronavirus/2019-ncov/about/steps-when-sick.html    For more options for care at [facility], please visit our website at [email or web address]    [facility] ([facility]) COVID-19 Hotline:  [phone]

## 2020-07-16 LAB
COVID-19 SPIKE RBD ABY TITER: NORMAL
COVID-19 SPIKE RBD ABY: NEGATIVE

## 2021-01-15 ENCOUNTER — HEALTH MAINTENANCE LETTER (OUTPATIENT)
Age: 56
End: 2021-01-15

## 2021-10-24 ENCOUNTER — HEALTH MAINTENANCE LETTER (OUTPATIENT)
Age: 56
End: 2021-10-24

## 2022-02-13 ENCOUNTER — HEALTH MAINTENANCE LETTER (OUTPATIENT)
Age: 57
End: 2022-02-13

## 2022-04-30 ENCOUNTER — VIRTUAL VISIT (OUTPATIENT)
Dept: URGENT CARE | Facility: CLINIC | Age: 57
End: 2022-04-30
Payer: COMMERCIAL

## 2022-04-30 DIAGNOSIS — E66.3 OVERWEIGHT: ICD-10-CM

## 2022-04-30 DIAGNOSIS — U07.1 INFECTION DUE TO 2019 NOVEL CORONAVIRUS: Primary | ICD-10-CM

## 2022-04-30 PROCEDURE — 99214 OFFICE O/P EST MOD 30 MIN: CPT | Mod: 95 | Performed by: PHYSICIAN ASSISTANT

## 2022-04-30 NOTE — PROGRESS NOTES
"Edgar is a 57 year old who is being evaluated via a billable video visit.      Video Start Time: 8:27 AM    Assessment & Plan     Infection due to 2019 novel coronavirus  - nirmatrelvir and ritonavir (PAXLOVID) therapy pack; Take 3 tablets by mouth 2 times daily    Overweight    COVID-19 positive patient.  Encounter for consideration of medication intervention. Patient does qualify for a prescription. Full discussion with patient including medication options, risks and benefits. Potential drug interactions reviewed with patient.     Treatment Planned Risks and benefits of medication were reviewed and Paxlovid was sent to patients [facility] pharmacy    Temporary change to home medications: Hold Atorvastatin    Estimated body mass index is 31.04 kg/m  as calculated from the following:    Height as of 3/2/20: 1.778 m (5' 10\").    Weight as of 3/2/20: 98.1 kg (216 lb 4.8 oz).  GFR Estimate   Date Value Ref Range Status   09/30/2019 >90 >60 mL/min/[1.73_m2] Final     Comment:     Non  GFR Calc  Starting 12/18/2018, serum creatinine based estimated GFR (eGFR) will be   calculated using the Chronic Kidney Disease Epidemiology Collaboration   (CKD-EPI) equation.       No results found for: [id number]    No follow-ups on file.    Virtual Urgent Care  [facility] URGENT [facility]ARE    Subjective   Edgar is a 57 year old who presents for the following health issues : Covid    HPI       COVID-19 Symptom Review  How many days ago did these symptoms start? 4 days ago    Are any of the following symptoms significant for you?    New or worsening difficulty breathing? No    Worsening cough? Yes, it's a dry cough.     Fever or chills? Yes, I felt feverish or had chills.    Headache: YES    Sore throat: YES    Chest pain: no    Diarrhea: no    Body aches? YES    What treatments has patient tried?   OTC cold remedies   Does patient live in a nursing home, group home, or shelter? no  Does patient have a way to get " food/medications during quarantined? Yes, I have a friend or family member who can help me.      Review of Systems   Constitutional, HEENT, cardiovascular, pulmonary, gi and gu systems are negative, except as otherwise noted.      Objective           Vitals:  No vitals were obtained today due to virtual visit.    Physical Exam   GENERAL: alert and no distress  EYES: Eyes grossly normal to inspection.  No discharge or erythema, or obvious scleral/conjunctival abnormalities.  RESP: cough, no wheezing, no respiratory distress  SKIN: Visible skin clear. No significant rash, abnormal pigmentation or lesions.  NEURO: Cranial nerves grossly intact.  Mentation and speech appropriate for age.  PSYCH: Mentation appears normal, affect normal/bright, judgement and insight intact, normal speech and appearance well-groomed.        Video-Visit Details    Type of service:  Video Visit    Video End Time:8:47 AM    Originating Location (pt. Location): Home    Distant Location (provider location):  [facility] [place] URGENT [facility]ARE     Platform used for Video Visit: [employer]

## 2022-04-30 NOTE — PATIENT INSTRUCTIONS
FACT SHEET FOR PATIENTS, PARENTS, AND CAREGIVERS  EMERGENCY USE AUTHORIZATION (EUA) OF PAXLOVID FOR CORONAVIRUS DISEASE 2019 (COVID-19)      You are being given this Fact Sheet because your healthcare provider believes it is necessary to provide you with PAXLOVID for the treatment of mild-to-moderate coronavirus disease (COVID-19) caused by the SARS-CoV-2 virus. This Fact Sheet contains information to help you understand the risks and benefits of taking the PAXLOVID you have received or may receive.  The [place] Food and Drug Administration (FDA) has issued an Emergency Use Authorization (EUA) to make PAXLOVID available during the COVID-19 pandemic (for more details about an EUA please see  What is an Emergency Use Authorization?  at the end of this document). PAXLOVID is not an FDA-approved medicine in the [place]. Read this Fact Sheet for information about PAXLOVID. Talk to your healthcare provider about your options or if you have any questions. It is your choice to take PAXLOVID.  What is COVID-19?  COVID-19 is caused by a virus called a coronavirus. You can get COVID-19 through close contact with another person who has the virus.  COVID-19 illnesses have ranged from very mild-to-severe, including illness resulting in death. While information so far suggests that most COVID-19 illness is mild, serious illness can happen and may cause some of your other medical conditions to become worse. Older people and people of all ages with severe, long lasting (chronic) medical conditions like heart disease, lung disease, and diabetes, for example seem to be at higher risk of being hospitalized for COVID-19.  What is PAXLOVID?  PAXLOVID is an investigational medicine used to treat mild-to-moderate COVID-19 in adults and children [12 years of age and older weighing at least 88 pounds (40 kg)] with positive results of direct SARS-CoV-2 viral testing, and who are at high risk for progression to severe COVID-19, including  hospitalization or death. PAXLOVID is investigational because it is still being studied. There is limited information about the safety and effectiveness of using PAXLOVID to treat people with mild-to-moderate COVID-19.  The FDA has authorized the emergency use of PAXLOVID for the treatment of mild-to- moderate COVID-19 in adults and children [12 years of age and older weighing at least 88 pounds (40 kg)] with a positive test for the virus that causes COVID-19, and who are at high risk for progression to severe COVID-19, including hospitalization or death, under an EUA.  1 Revised: 18 March 2022    What should I tell my healthcare provider before I take PAXLOVID?  Tell your healthcare provider if you:  ? Have any allergies  ? Have liver or kidney disease  ? Are pregnant or plan to become pregnant  ? Are breastfeeding a child  ? Have any serious illnesses  Tell your healthcare provider about all the medicines you take, including prescription and over-the-counter medicines, vitamins, and herbal supplements.  Some medicines may interact with PAXLOVID and may cause serious side effects. Keep a list of your medicines to show your healthcare provider and pharmacist when you get a new medicine.  You can ask your healthcare provider or pharmacist for a list of medicines that interact with PAXLOVID. Do not start taking a new medicine without telling your healthcare provider. Your healthcare provider can tell you if it is safe to take PAXLOVID with other medicines.  Tell your healthcare provider if you are taking combined hormonal contraceptive.  PAXLOVID may affect how your birth control pills work. Females who are able to become pregnant should use another effective alternative form of contraception or an additional barrier method of contraception. Talk to your healthcare provider if you have any questions about contraceptive methods that might be right for you.  How do I take PAXLOVID?  ? PAXLOVID consists of 2 medicines:  nirmatrelvir and ritonavir.  o Take 2 pink tablets of nirmatrelvir with 1 white tablet of ritonavir by mouth  2 times each day (in the morning and in the evening) for 5 days. For each  dose, take all 3 tablets at the same time.  o If you have kidney disease, talk to your healthcare provider. You  may need a different dose.  ? Swallow the tablets whole. Do not chew, break, or crush the tablets.  ? Take PAXLOVID with or without food.  ? Do not stop taking PAXLOVID without talking to your healthcare provider, even if  you feel better.  ? If you miss a dose of PAXLOVID within 8 hours of the time it is usually taken, take it as soon as you remember. If you miss a dose by more than 8 hours, skip the missed dose and take the next dose at your regular time. Do not take  2 doses of PAXLOVID at the same time.  ? If you take too much PAXLOVID, call your healthcare provider or go to the nearest hospital emergency room right away.  ? If you are taking a ritonavir- or cobicistat-containing medicine to treat hepatitis C or Human Immunodeficiency Virus (HIV), you should continue to take your medicine as prescribed by your healthcare provider.  2 Revised: 18 March 2022    Talk to your healthcare provider if you do not feel better or if you feel worse after 5 days.  Who should generally not take PAXLOVID?  Do not take PAXLOVID if:  ? You are allergic to nirmatrelvir, ritonavir, or any of the ingredients in PAXLOVID.  ? You are taking any of the following medicines:  o Alfuzosin  o Pethidine, propoxyphene  o Ranolazine  o Amiodarone, dronedarone, flecainide, propafenone, quinidine o Colchicine  o Lurasidone, pimozide, clozapine  o Dihydroergotamine, ergotamine, methylergonovine  o Lovastatin, simvastatin  o Sildenafil (Revatio ) for pulmonary arterial hypertension (PAH) o Triazolam, oral midazolam  o Apalutamide  o Carbamazepine, phenobarbital, phenytoin  o Rifampin  o [place] s Wort (hypericum perforatum)  Taking PAXLOVID with these  medicines may cause serious or life-threatening side effects or affect how PAXLOVID works.  These are not the only medicines that may cause serious side effects if taken with PAXLOVID. PAXLOVID may increase or decrease the levels of multiple other medicines. It is very important to tell your healthcare provider about all of the medicines you are taking because additional laboratory tests or changes in the dose of your other medicines may be necessary while you are taking PAXLOVID. Your healthcare provider may also tell you about specific symptoms to watch out for that may indicate that you need to stop or decrease the dose of some of your other medicines.  What are the important possible side effects of PAXLOVID?  Possible side effects of PAXLOVID are:  ? Allergic Reactions. Allergic reactions can happen in people taking  PAXLOVID, even after only 1 dose. Stop taking PAXLOVID and call your healthcare provider right away if you get any of the following symptoms of an allergic reaction:  o hives  o trouble swallowing or breathing  o swelling of the mouth, lips, or face o throat tightness  o hoarseness  3 Revised: 18 March 2022    o skin rash  ? Liver Problems. Tell your healthcare provider right away if you have any of  these signs and symptoms of liver problems: loss of appetite, yellowing of your skin and the whites of eyes (jaundice), dark-colored urine, pale colored stools and itchy skin, stomach area (abdominal) pain.  ? Resistance to HIV Medicines. If you have untreated HIV infection, PAXLOVID may lead to some HIV medicines not working as well in the future.  ? Other possible side effects include:  o altered sense of taste  o diarrhea  o high blood pressure o muscle aches  These are not all the possible side effects of PAXLOVID. Not many people have taken PAXLOVID. Serious and unexpected side effects may happen. PAXLOVID is still being studied, so it is possible that all of the risks are not known at this  time.  What other treatment choices are there?  Veklury (remdesivir) is FDA-approved for the treatment of mild-to-moderate COVID-19 in certain adults and children. Talk with your doctor to see if Veklury is appropriate for you.  Like PAXLOVID, FDA may also allow for the emergency use of other medicines to treat people with COVID-19. Go to https://www.fda.gov/emergency-preparedness-and- response/mcm-legal-regulatory-and-policy-framework/emergency-use-authorization for information on the emergency use of other medicines that are authorized by FDA to treat people with COVID-19. Your healthcare provider may talk with you about clinical trials for which you may be eligible.  It is your choice to be treated or not to be treated with PAXLOVID. Should you decide not to receive it or for your child not to receive it, it will not change your standard medical care.  What if I am pregnant or breastfeeding?  There is no experience treating pregnant women or breastfeeding mothers with PAXLOVID. For a mother and unborn baby, the benefit of taking PAXLOVID may be greater than the risk from the treatment. If you are pregnant, discuss your options and specific situation with your healthcare provider.  It is recommended that you use effective barrier contraception or do not have sexual activity while taking PAXLOVID.  If you are breastfeeding, discuss your options and specific situation with your healthcare provider.  4 Revised: 18 March 2022    How do I report side effects with PAXLOVID?  Contact your healthcare provider if you have any side effects that bother you or do not go away.  Report side effects to FDA MedWatch at www.fda.gov/medwatch or call 0-762-JEH- 6485 or you can report side effects to [employer] at the contact information provided below.  Telephone number  www.[employer] [phone] How should I store PAXLOVID?  Store PAXLOVID tablets at room temperature, between 68?F to 77?F (20?C to  25?C).  How can I learn more about COVID-19?  ? Ask your healthcare provider.  ? Visit https://www.cdc.gov/COVID19.  ? Contact your local or state public health department.  What is an Emergency Use Authorization (EUA)?  The [place] FDA has made PAXLOVID available under an emergency access mechanism called an Emergency Use Authorization (EUA). The EUA is supported by a  of Health and Human Service ([facility]) declaration that circumstances exist to justify the emergency use of drugs and biological products during the COVID-19 pandemic.  PAXLOVID for the treatment of mild-to-moderate COVID-19 in adults and children  [12 years of age and older weighing at least 88 pounds (40 kg)] with positive results of direct SARS-CoV-2 viral testing, and who are at high risk for progression to severe COVID-19, including hospitalization or death, has not undergone the same type of review as an FDA-approved product. In issuing an EUA under the COVID-19 public health emergency, the FDA has determined, among other things, that based on the total amount of scientific evidence available including data from adequate and well-controlled clinical trials, if available, it is reasonable to believe that the product may be effective for diagnosing, treating, or preventing COVID-19, or a serious or life-threatening disease or condition caused by COVID-19; that the known and potential benefits of the product, when used to diagnose, treat, or prevent such disease or condition, outweigh the known and potential risks of such product; and that there are no adequate, approved, and available alternatives.  All of these criteria must be met to allow for the product to be used in the treatment of patients during the COVID-19 pandemic. The EUA for PAXLOVID is in effect for the duration of the COVID-19 declaration justifying emergency use of this product, unless terminated or revoked (after which the products may no longer be used under the  EUA).    Website  Fax number         5 Revised: 18 March 2022    Additional Information  For general questions, visit the website or call the telephone number provided below.  Website Telephone number  You can also go to www.[employer] or call [phone] for more information.  [id number]  Revised: 18 March 2022      [email or web address]     [phone] ([phone])      6 Revised: 18 March 2022

## 2022-05-02 ENCOUNTER — TELEPHONE (OUTPATIENT)
Dept: FAMILY MEDICINE | Facility: CLINIC | Age: 57
End: 2022-05-02
Payer: COMMERCIAL

## 2022-05-02 NOTE — LETTER
[facility]  [address] [address]  Phone: [phone]    April 30, 2022        Edgar Celestin  [address] [address]          To whom it may concern:    RE: Edgar Celestin    Patient was seen and treated today by virtual visit. He has Covid 19 and will be started on antiviral treatment. Symptoms started 4 days prior to this visit and per CDC guidelines patient can return to work 5 days after symptoms onset as long as he is no longer having fevers or body aches. He would then need to be able to mask and distance for an additional 5 days.     Please contact me for questions or concerns.      Sincerely,    Angie Babcock PA-C

## 2022-05-02 NOTE — TELEPHONE ENCOUNTER
Reason for Call:  Other call back    Detailed comments: Patient was seen in virtual urgent care 04/30/2022 and needs a note for work.    Phone Number Patient can be reached at: Cell number on file:    Telephone Information:   Mobile [phone]       Best Time: Anytime    Can we leave a detailed message on this number? YES    Call taken on 5/2/2022 at 11:47 AM by Riya Fairchild

## 2022-10-16 ENCOUNTER — HEALTH MAINTENANCE LETTER (OUTPATIENT)
Age: 57
End: 2022-10-16

## 2022-11-03 DIAGNOSIS — Z82.49 FAMILY HISTORY OF CORONARY ARTERY DISEASE: Primary | ICD-10-CM

## 2023-03-26 ENCOUNTER — HEALTH MAINTENANCE LETTER (OUTPATIENT)
Age: 58
End: 2023-03-26

## 2024-06-01 ENCOUNTER — HEALTH MAINTENANCE LETTER (OUTPATIENT)
Age: 59
End: 2024-06-01

## 2025-06-02 ENCOUNTER — TRANSFERRED RECORDS (OUTPATIENT)
Dept: ADMINISTRATIVE | Facility: CLINIC | Age: 60
End: 2025-06-02
Payer: COMMERCIAL

## (undated) DEVICE — ENDO SNARE EXACTO COLD 9MM LOOP 2.4MMX230CM 00711115

## (undated) DEVICE — KIT ENDO TURNOVER/PROCEDURE W/CLEAN A SCOPE LINERS 103888

## (undated) DEVICE — ENDO TRAP POLYP QUICK CATCH 710201

## (undated) RX ORDER — FENTANYL CITRATE 50 UG/ML
INJECTION, SOLUTION INTRAMUSCULAR; INTRAVENOUS
Status: DISPENSED
Start: 2020-01-13

## (undated) RX ORDER — SIMETHICONE 40MG/0.6ML
SUSPENSION, DROPS(FINAL DOSAGE FORM)(ML) ORAL
Status: DISPENSED
Start: 2020-01-13